# Patient Record
Sex: MALE | Race: WHITE | NOT HISPANIC OR LATINO | URBAN - METROPOLITAN AREA
[De-identification: names, ages, dates, MRNs, and addresses within clinical notes are randomized per-mention and may not be internally consistent; named-entity substitution may affect disease eponyms.]

---

## 2022-01-24 ENCOUNTER — TELEPHONE ENCOUNTER (OUTPATIENT)
Dept: URBAN - METROPOLITAN AREA CLINIC 54 | Facility: CLINIC | Age: 57
End: 2022-01-24

## 2022-02-14 ENCOUNTER — WEB ENCOUNTER (OUTPATIENT)
Dept: URBAN - METROPOLITAN AREA CLINIC 54 | Facility: CLINIC | Age: 57
End: 2022-02-14

## 2022-02-14 ENCOUNTER — DASHBOARD ENCOUNTERS (OUTPATIENT)
Age: 57
End: 2022-02-14

## 2022-02-14 ENCOUNTER — OFFICE VISIT (OUTPATIENT)
Dept: URBAN - METROPOLITAN AREA CLINIC 54 | Facility: CLINIC | Age: 57
End: 2022-02-14
Payer: COMMERCIAL

## 2022-02-14 VITALS
BODY MASS INDEX: 26.36 KG/M2 | HEIGHT: 69 IN | HEART RATE: 56 BPM | WEIGHT: 178 LBS | DIASTOLIC BLOOD PRESSURE: 88 MMHG | TEMPERATURE: 97.2 F | SYSTOLIC BLOOD PRESSURE: 160 MMHG

## 2022-02-14 DIAGNOSIS — K85.80 OTHER ACUTE PANCREATITIS, UNSPECIFIED COMPLICATION STATUS: ICD-10-CM

## 2022-02-14 DIAGNOSIS — K51.80 OTHER ULCERATIVE COLITIS WITHOUT COMPLICATION: ICD-10-CM

## 2022-02-14 DIAGNOSIS — R74.8 ABNORMAL ALKALINE PHOSPHATASE TEST: ICD-10-CM

## 2022-02-14 PROBLEM — 64766004: Status: ACTIVE | Noted: 2022-02-14

## 2022-02-14 PROCEDURE — 99214 OFFICE O/P EST MOD 30 MIN: CPT | Performed by: INTERNAL MEDICINE

## 2022-02-14 RX ORDER — MESALAMINE 1.2 G/1
TAKE 2 TABLETS (2.4 GRAM) BY ORAL ROUTE ONCE DAILY WITH A MEAL TABLET, DELAYED RELEASE ORAL 1
Qty: 0 | Refills: 0 | Status: ACTIVE | COMMUNITY
Start: 1900-01-01

## 2022-02-14 RX ORDER — OMEPRAZOLE 40 MG/1
TAKE 1 CAPSULE (40 MG) BY ORAL ROUTE ONCE DAILY BEFORE A MEAL CAPSULE, DELAYED RELEASE PELLETS ORAL 1
Qty: 0 | Refills: 0 | COMMUNITY
Start: 1900-01-01

## 2022-02-14 NOTE — HPI-TODAY'S VISIT:
The patient is a 53 year old male , who presents on referral from Jm Razo MD , for a gastroenterology evaluation for above issues. Patient carries a diagnosis of pancreatitis, UC and colon polyps and GERD. His episode of AP was in 2015 which required PD stenting at [place]. His gallbladder is intact. He was being followed by a ? pancreatic lesion which was later deemed to be benign. He also carries a diagnosis of ? ulcerative proctitis. He has been on 5 ASA mesalamine suppositories in the past. He is on po mesalamine 2.4 gm daily. His symptoms are well controlled. No family history of colon cancer. He is on Omeprazole PRN for mild intermittent GERD.    Follow Up 2/14/22: Patient presents for routine follow up. UC is under control with Mesalamine 2.4 gm po daily. He has mild and occasional epigastric burning discomfort without any nausea and vomiting. He has not been on Omeprazole for over 3 years. No new complaints. He was noted to have mildly abnormal  () in Dec 2021. TG's noted at > 200. He denies any RUQ symptoms. No USG on file. Transaminases are normal. He denies use of alcohol.

## 2022-02-15 LAB
A/G RATIO: 2.1
ALBUMIN: 4.7
ALKALINE PHOSPHATASE: 115
ALT (SGPT): 22
AST (SGOT): 22
BASO (ABSOLUTE): 0.1
BASOS: 1
BILIRUBIN, TOTAL: 0.3
BUN/CREATININE RATIO: 13
BUN: 12
CA 19-9: 8
CALCIUM: 9.5
CARBON DIOXIDE, TOTAL: 23
CHLORIDE: 104
CREATININE: 0.9
EGFR IF AFRICN AM: 109
EGFR IF NONAFRICN AM: 94
EOS (ABSOLUTE): 0.5
EOS: 10
GLOBULIN, TOTAL: 2.2
GLUCOSE: 94
HEMATOCRIT: 47.4
HEMATOLOGY COMMENTS:: (no result)
HEMOGLOBIN: 15.8
IMMATURE CELLS: (no result)
IMMATURE GRANS (ABS): 0
IMMATURE GRANULOCYTES: 0
INR: 1
LYMPHS (ABSOLUTE): 1.5
LYMPHS: 28
MCH: 30.2
MCHC: 33.3
MCV: 91
MONOCYTES(ABSOLUTE): 0.5
MONOCYTES: 9
NEUTROPHILS (ABSOLUTE): 2.8
NEUTROPHILS: 52
NRBC: (no result)
PLATELETS: 206
POTASSIUM: 4.7
PROTEIN, TOTAL: 6.9
PROTHROMBIN TIME: 10.1
RBC: 5.24
RDW: 12.3
SODIUM: 142
WBC: 5.4

## 2022-02-23 ENCOUNTER — OFFICE VISIT (OUTPATIENT)
Dept: URBAN - METROPOLITAN AREA CLINIC 53 | Facility: CLINIC | Age: 57
End: 2022-02-23

## 2022-02-23 RX ORDER — MESALAMINE 1.2 G/1
TAKE 2 TABLETS (2.4 GRAM) BY ORAL ROUTE ONCE DAILY WITH A MEAL TABLET, DELAYED RELEASE ORAL 1
Qty: 0 | Refills: 0 | Status: ACTIVE | COMMUNITY
Start: 1900-01-01

## 2022-02-23 RX ORDER — OMEPRAZOLE 40 MG/1
TAKE 1 CAPSULE (40 MG) BY ORAL ROUTE ONCE DAILY BEFORE A MEAL CAPSULE, DELAYED RELEASE PELLETS ORAL 1
Qty: 0 | Refills: 0 | COMMUNITY
Start: 1900-01-01

## 2022-02-25 ENCOUNTER — TELEPHONE ENCOUNTER (OUTPATIENT)
Dept: URBAN - METROPOLITAN AREA CLINIC 92 | Facility: CLINIC | Age: 57
End: 2022-02-25

## 2022-12-19 ENCOUNTER — TELEPHONE ENCOUNTER (OUTPATIENT)
Dept: URBAN - METROPOLITAN AREA CLINIC 54 | Facility: CLINIC | Age: 57
End: 2022-12-19

## 2022-12-19 RX ORDER — MESALAMINE 1.2 G/1
TAKE 2 TABLETS (2.4 GRAM) BY ORAL ROUTE ONCE DAILY WITH A MEAL TABLET, DELAYED RELEASE ORAL ONCE A DAY
Qty: 180 | Refills: 5

## 2023-04-25 NOTE — PHYSICAL EXAM NEUROLOGIC:
oriented to person, place and time , normal sensation , short and long term memory intact blood-tinged urine/pelvic pain no weakness/no vertigo/no focal seizures/no syncope

## 2024-06-20 ENCOUNTER — TELEPHONE ENCOUNTER (OUTPATIENT)
Dept: URBAN - METROPOLITAN AREA CLINIC 54 | Facility: CLINIC | Age: 59
End: 2024-06-20

## 2024-06-20 RX ORDER — MESALAMINE 1.2 G/1
TAKE 2 TABLETS (2.4 GRAM) BY ORAL ROUTE ONCE DAILY WITH A MEAL TABLET, DELAYED RELEASE ORAL ONCE A DAY
Qty: 180 | Refills: 5
End: 2025-12-12

## 2025-01-23 ENCOUNTER — OFFICE VISIT (OUTPATIENT)
Dept: URBAN - METROPOLITAN AREA CLINIC 54 | Facility: CLINIC | Age: 60
End: 2025-01-23
Payer: COMMERCIAL

## 2025-01-23 VITALS
BODY MASS INDEX: 26.69 KG/M2 | SYSTOLIC BLOOD PRESSURE: 149 MMHG | HEART RATE: 62 BPM | DIASTOLIC BLOOD PRESSURE: 93 MMHG | WEIGHT: 180.2 LBS | HEIGHT: 69 IN | TEMPERATURE: 98.3 F

## 2025-01-23 DIAGNOSIS — K85.80 OTHER ACUTE PANCREATITIS, UNSPECIFIED COMPLICATION STATUS: ICD-10-CM

## 2025-01-23 DIAGNOSIS — R74.8 ABNORMAL ALKALINE PHOSPHATASE TEST: ICD-10-CM

## 2025-01-23 DIAGNOSIS — K51.80 OTHER ULCERATIVE COLITIS WITHOUT COMPLICATION: ICD-10-CM

## 2025-01-23 PROCEDURE — 99214 OFFICE O/P EST MOD 30 MIN: CPT | Performed by: PHYSICIAN ASSISTANT

## 2025-01-23 RX ORDER — AMLODIPINE BESYLATE 5 MG/1
1 TABLET TABLET ORAL ONCE A DAY
Status: ACTIVE | COMMUNITY

## 2025-01-23 RX ORDER — MESALAMINE 1.2 G/1
TAKE 2 TABLETS (2.4 GRAM) BY ORAL ROUTE ONCE DAILY WITH A MEAL TABLET, DELAYED RELEASE ORAL ONCE A DAY
Qty: 180 | Refills: 5 | Status: ACTIVE | COMMUNITY
End: 2025-12-12

## 2025-01-23 NOTE — HPI-TODAY'S VISIT:
The patient is a 53 year old male, who presents on referral from Jm Razo MD, for a gastroenterology evaluation for above issues. Patient carries a diagnosis of pancreatitis, UC and colon polyps and GERD. His episode of AP was in 2015 which required PD stenting at [place]. His gallbladder is intact. He was being followed by a ? pancreatic lesion which was later deemed to be benign. He also carries a diagnosis of ? ulcerative proctitis. He has been on 5 ASA mesalamine suppositories in the past. He is on po mesalamine 2.4 gm daily. His symptoms are well controlled. No family history of colon cancer. He is on Omeprazole PRN for mild intermittent GERD.    Follow Up 2/14/22: Patient presents for routine follow up. UC is under control with Mesalamine 2.4 gm po daily. He has mild and occasional epigastric burning discomfort without any nausea and vomiting. He has not been on Omeprazole for over 3 years. No new complaints. He was noted to have mildly abnormal  () in Dec 2021. TG's noted at > 200. He denies any RUQ symptoms. No USG on file. Transaminases are normal. He denies use of alcohol.  Follow Up 1/23/2025: Bayron Kat is a 59-year-old male patient with PMH of UC who was referred to Dr. Hesham Razo for screening colonoscopy. A copy of this document will be sent to the referring provider. Last colonoscopy was 2019 with Dr. Kwon that showed mild UC. Normal descending and transverse colon and IH. Patient has remained on mesalamine 2.4 g p.o. daily monotherapy. No ongoing abdominal pain or rectal bleeding. One BM daily. We will request recent labs.

## 2025-01-23 NOTE — PHYSICAL EXAM HENT:
CHIEF COMPLAINT:  Skin Assessment       HISTORY OF PRESENT ILLNESS:  Branden Alves is a 79 year old male who presented requesting evaluation for a total skin assessment.  Spots of concern on nose , scalp and bump under right eyelid.    PAST HISTORIES:  Personal history of skin cancer:Yes:  Basal Cell Carcinoma,  face, Right temple, nose and back; actinic damage and actinic keratoses  Family history of skin cancer: No  History of sunburns/tanning bed use:  sunburns as a child/teenager, sunburns occasionally as an adult and frequent tanning bed use   Sunscreen use: always    Review of systems:     Constitutional: Negative for fever and chills.   Integumentary: Negative for rash.   Cardiovascular: Negative for chest pain or chest pressure.   Hematological: Negative for bleeding easily or anticoagulation therapy    Physical Exam:     General:  Well nourished, well developed, in no acute distress  Skin: few stuck on papules upper trunk arms gritty papule arm cheek us to papules right-sided nose bridge of nose multiple scars head neck telangiectasias actinic damage stuck on papules head neck arms legs chest back full skin exam done  cystic papule right eyelid lower right eye. Gritty papule mid upper forehead at scalp line scattered gritty papules forehead arms  Head:  Normocephalic-atraumatic.   Neuro:  Orientated x 4.  No focal deficits.    Examination of the scalp/body hair, face, neck, ears, eyelids/conjunctiva, lips/oral mucosa, chest, back, abdomen, right upper extremity, left upper extremity, right lower extremity, left lower extremity, digits/nails and genitals/buttocks was performed and findings were within normal limits unless specified below.    Cryotherapy Procedure Note:  Indication: 4 Actinic keratoses forehead arms    Risks and Benefits:  Possible treatment options were discussed with the patient, including  the benefits and risks of the procedure.  The patient elected to proceed.    Procedure: After  Head,  normocephalic,  atraumatic,  Face,  Face within normal limits,  Ears,  External ears within normal limits,  Nose/Nasopharynx,  External nose  normal appearance, no nasal discharge,  Mouth and Throat,  Breath odor normal,  Lips,  Appearance normal verbal informed consent with discussion of wound formation, potential incomplete resolution of lesion(s), blistering, pain, and risk for post inflammatory hyper or hypopigmentation, the lesions (as described above in the physical examination and assessment and plan) were treated individually with liquid nitrogen 1-2 times depending on size of lesion and for 5-10 seconds with each freeze as necessitated by the lesion.  Wound care instructions given.  Patient will call with any concerns or complaints.    The patient tolerated the procedure well, without difficulty.    Complications: None.       Shave Biopsy Procedure Note:  Samples per site: scalp    Risks and Benefits:  Possible treatment options were discussed with the patient, including  the benefits and risks of the procedure.  The patient elected to proceed.    Procedure: The patient was appropriately positioned.  The site was cleansed. Local anesthesia was obtained by infiltration using 1%  Buffered Lidocaine with epinephrine.  Using sterile technique a shave biopsy of the lesion was performed flush with the surrounding skin.  Hemostasis was obtained using cautery.  A sterile dressing was applied.    The patient tolerated the procedure well, without difficulty.  Wound care instructions were given.    Complications: None.    Specimen Disposition: Specimen(s) obtained and sent for pathological evaluation.      Assessment/plan:  Diagnoses and all orders for this visit:  SK (solar keratosis)  Actinic keratosis  HX SKIN MALIGNANCY NEC BCC scalp 2006  Neoplasm of uncertain behavior  -     SURGICAL PATHOLOGY  lesion by eye   did not resolve with incision with an 11 blade as would a hydrocystoma. He did consider biopsy but since on the conjunctival area needs to either be Dr. Juan or ophthalmologist or oculoplastics he is following closely with his eye doctor will recheck with me in 3 months sooner if he feels not improved    Benign appearing nevi  · Brown macules and  papules, homogeneous  Plan:    Follow  Call if change in growth  Suggest sunscreens, monthly self-examinations, and yearly total skin exam.   Patient to watch for the ABCDE's of pigmented lesions or persistent crusts, erosions, irritated areas.    Technique of skin self exam discussed with patient and given the AAD information on this.    The nature of sun-induced photo-aging and skin cancers is discussed.  Sun avoidance, protective clothing, and the use of 30-SPF sunscreens is advised.    Actinic Keratoses:  · Pink gritty papules noted on head/neck, right hand face  Plan:    Follow  Call if change in growth    Actinic damage  · Thin actinically damaged skin  · Photo exposed areas  Plan:  Follow  Call if change in growth noted        Return in about 3 months , or if symptoms worsen or fail to improve.      This is Harper Wild MA, acting as a scribe for Sofia Woodson MD. The above note represents Sofia Woodson MD evaluation of this patient.    Harper Wild MA    I, Dr Woodson, attest that the documentation recorded by the scribe accurately and completely reflects the service(s) I personally performed and the decisions made by me.

## 2025-03-18 ENCOUNTER — OFFICE VISIT (OUTPATIENT)
Dept: URBAN - METROPOLITAN AREA SURGERY CENTER 14 | Facility: SURGERY CENTER | Age: 60
End: 2025-03-18
Payer: COMMERCIAL

## 2025-03-18 ENCOUNTER — CLAIMS CREATED FROM THE CLAIM WINDOW (OUTPATIENT)
Dept: URBAN - METROPOLITAN AREA CLINIC 4 | Facility: CLINIC | Age: 60
End: 2025-03-18
Payer: COMMERCIAL

## 2025-03-18 DIAGNOSIS — K63.89 OTHER SPECIFIED DISEASES OF INTESTINE: ICD-10-CM

## 2025-03-18 DIAGNOSIS — D12.2 ADENOMA OF ASCENDING COLON: ICD-10-CM

## 2025-03-18 DIAGNOSIS — K63.5 BENIGN COLON POLYP: ICD-10-CM

## 2025-03-18 DIAGNOSIS — D12.2 BENIGN NEOPLASM OF ASCENDING COLON: ICD-10-CM

## 2025-03-18 DIAGNOSIS — K63.5 HYPERPLASTIC COLON POLYP: ICD-10-CM

## 2025-03-18 DIAGNOSIS — Z87.19 PERSONAL HISTORY OF OTHER DISEASES OF THE DIGESTIVE SYSTEM: ICD-10-CM

## 2025-03-18 DIAGNOSIS — K63.5 POLYP OF COLON: ICD-10-CM

## 2025-03-18 PROCEDURE — 00811 ANES LWR INTST NDSC NOS: CPT | Performed by: NURSE ANESTHETIST, CERTIFIED REGISTERED

## 2025-03-18 PROCEDURE — 45380 COLONOSCOPY AND BIOPSY: CPT | Performed by: INTERNAL MEDICINE

## 2025-03-18 PROCEDURE — 88305 TISSUE EXAM BY PATHOLOGIST: CPT | Performed by: PATHOLOGY

## 2025-03-18 PROCEDURE — 45385 COLONOSCOPY W/LESION REMOVAL: CPT | Performed by: INTERNAL MEDICINE

## 2025-03-18 RX ORDER — MESALAMINE 1.2 G/1
TAKE 2 TABLETS (2.4 GRAM) BY ORAL ROUTE ONCE DAILY WITH A MEAL TABLET, DELAYED RELEASE ORAL ONCE A DAY
Qty: 180 | Refills: 5 | Status: ACTIVE | COMMUNITY
End: 2025-12-12

## 2025-03-18 RX ORDER — AMLODIPINE BESYLATE 5 MG/1
1 TABLET TABLET ORAL ONCE A DAY
Status: ACTIVE | COMMUNITY

## 2025-07-28 ENCOUNTER — OFFICE VISIT (OUTPATIENT)
Dept: URBAN - METROPOLITAN AREA CLINIC 54 | Facility: CLINIC | Age: 60
End: 2025-07-28
Payer: COMMERCIAL

## 2025-07-28 DIAGNOSIS — Z86.0100 PERSONAL HISTORY OF COLONIC POLYPS: ICD-10-CM

## 2025-07-28 DIAGNOSIS — K51.80 OTHER ULCERATIVE COLITIS WITHOUT COMPLICATION: ICD-10-CM

## 2025-07-28 DIAGNOSIS — R74.8 ABNORMAL ALKALINE PHOSPHATASE TEST: ICD-10-CM

## 2025-07-28 DIAGNOSIS — K85.80 OTHER ACUTE PANCREATITIS, UNSPECIFIED COMPLICATION STATUS: ICD-10-CM

## 2025-07-28 PROCEDURE — 99213 OFFICE O/P EST LOW 20 MIN: CPT | Performed by: INTERNAL MEDICINE

## 2025-07-28 RX ORDER — AMLODIPINE BESYLATE 5 MG/1
1 TABLET TABLET ORAL ONCE A DAY
Status: ACTIVE | COMMUNITY

## 2025-07-28 RX ORDER — MESALAMINE 1.2 G/1
TAKE 2 TABLETS (2.4 GRAM) BY ORAL ROUTE ONCE DAILY WITH A MEAL TABLET, DELAYED RELEASE ORAL ONCE A DAY
Qty: 180 | Refills: 5 | Status: ACTIVE | COMMUNITY
End: 2025-12-12

## 2025-07-28 NOTE — HPI-TODAY'S VISIT:
The patient is a 53 year old male, who presents on referral from Jm Razo MD, for a gastroenterology evaluation for above issues. Patient carries a diagnosis of pancreatitis, UC and colon polyps and GERD. His episode of AP was in 2015 which required PD stenting at [place]. His gallbladder is intact. He was being followed by a ? pancreatic lesion which was later deemed to be benign. He also carries a diagnosis of ? ulcerative proctitis. He has been on 5 ASA mesalamine suppositories in the past. He is on po mesalamine 2.4 gm daily. His symptoms are well controlled. No family history of colon cancer. He is on Omeprazole PRN for mild intermittent GERD.    Follow Up 2/14/22: Patient presents for routine follow up. UC is under control with Mesalamine 2.4 gm po daily. He has mild and occasional epigastric burning discomfort without any nausea and vomiting. He has not been on Omeprazole for over 3 years. No new complaints. He was noted to have mildly abnormal  () in Dec 2021. TG's noted at > 200. He denies any RUQ symptoms. No USG on file. Transaminases are normal. He denies use of alcohol.  Follow Up 1/23/2025: Bayron Kat is a 59-year-old male patient with PMH of UC who was referred to Dr. Hesham Razo for screening colonoscopy. A copy of this document will be sent to the referring provider. Last colonoscopy was 2019 with Dr. Kwon that showed mild UC. Normal descending and transverse colon and IH. Patient has remained on mesalamine 2.4 g p.o. daily monotherapy. No ongoing abdominal pain or rectal bleeding. One BM daily. We will request recent labs.  Follow Up 7/28/25: Patient presents for routine follow up. Colonoscopy in March 2025 showed quiescent colitis. 3 small TA/hyperplastic polyps were removed. He has no complaints at this time.